# Patient Record
Sex: MALE | Race: WHITE | Employment: STUDENT | URBAN - METROPOLITAN AREA
[De-identification: names, ages, dates, MRNs, and addresses within clinical notes are randomized per-mention and may not be internally consistent; named-entity substitution may affect disease eponyms.]

---
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
## 2020-01-02 ENCOUNTER — APPOINTMENT (OUTPATIENT)
Dept: GENERAL RADIOLOGY | Age: 20
End: 2020-01-02
Attending: PHYSICIAN ASSISTANT
Payer: COMMERCIAL

## 2020-01-02 ENCOUNTER — HOSPITAL ENCOUNTER (OUTPATIENT)
Age: 20
Discharge: HOME | End: 2020-01-02
Payer: COMMERCIAL

## 2020-01-02 VITALS
TEMPERATURE: 97 F | OXYGEN SATURATION: 100 % | DIASTOLIC BLOOD PRESSURE: 59 MMHG | HEART RATE: 72 BPM | RESPIRATION RATE: 20 BRPM | SYSTOLIC BLOOD PRESSURE: 135 MMHG

## 2020-01-02 DIAGNOSIS — S97.102A CRUSHING INJURY OF TOE OF LEFT FOOT, INITIAL ENCOUNTER: Primary | ICD-10-CM

## 2020-01-02 PROCEDURE — 99214 OFFICE O/P EST MOD 30 MIN: CPT

## 2020-01-02 PROCEDURE — 99213 OFFICE O/P EST LOW 20 MIN: CPT

## 2020-01-02 PROCEDURE — 73660 X-RAY EXAM OF TOE(S): CPT | Performed by: PHYSICIAN ASSISTANT

## 2020-01-02 RX ORDER — CEPHALEXIN 500 MG/1
500 CAPSULE ORAL 4 TIMES DAILY
Qty: 40 CAPSULE | Refills: 0 | Status: SHIPPED | OUTPATIENT
Start: 2020-01-02 | End: 2020-01-12

## 2020-01-03 NOTE — ED INITIAL ASSESSMENT (HPI)
WORKER'S COMP INITIAL INJURY    >> CHIEF COMPLAINT:      Left big toe injury            DATE OF INJURY:    Jan 2, 2020                               TIME:    11 am                                 JOB:     [employer]                       COMPANY:    [employer]

## 2020-01-03 NOTE — ED PROVIDER NOTES
Patient Seen in: THE [facility] Care In [facility]      History   Patient presents with:   Toe Injury    Stated Complaint: W/C Left foot injury    HPI    19-year-old male here with complaint of pain and swelling to his left foot first digit after a for Nose normal.      Mouth/Throat:      Mouth: Mucous membranes are moist.   Eyes:      Conjunctiva/sclera: Conjunctivae normal.      Pupils: Pupils are equal, round, and reactive to light. Neck:      Musculoskeletal: Normal range of motion and neck supple. compression elevation. Take the full course of antibiotics as prescribed. Change dressing daily. Look for any continuing signs and symptoms of infection: Redness, swelling, streaking, drainage or fevers.   Follow-up with your primary care physician and/o

## (undated) NOTE — ED AVS SNAPSHOT
THE [facility] Emergency Department in [address] [facility]    Phone:  [phone]    Fax:  [phone]           Elsa Allen   MRN: [id number]    Department:  THE [facility] Emergency Department in [place]   Date of Visit: IF THERE IS ANY CHANGE OR WORSENING OF YOUR CONDITION, CALL YOUR PRIMARY CARE PHYSICIAN AT ONCE OR RETURN IMMEDIATELY TO THE EMERGENCY DEPARTMENT.     If you have been prescribed any medication(s), please fill your prescription right away and begin taking t

## (undated) NOTE — LETTER
Date: 4/11/2018    Patient Name: Elvis Delaney          To Whom it may concern: This letter has been written at the patient's request. The above patient was seen at the [facility] for treatment of a medical condition.     The patient may r

## (undated) NOTE — ED AVS SNAPSHOT
[facility] Emergency Department in [address] [place] [address]    Phone:  [phone]    Fax:  [phone]           Kristan Patton   MRN: [id number]    Department:  [facility] Emergency Department in [place]   Date of Visit: Si usted tiene algun problema con jamil sequimiento, por favor llame a nuestro adminstrador de jean paul al [phone]    Expect to receive an electronic request (by e-mail or text) to complete a self-assessment the day after your visit.   You may also receiv [facility] [address] ([address]) [address]  ([address]) [address] [facility]. ([address]) [address] [address] [facility]. (8

## (undated) NOTE — ED AVS SNAPSHOT
Neena Iva   MRN: [id number]    Department:  THE [facility] Emergency Department in [place]   Date of Visit:  10/10/2017           Disclosure     Insurance plans vary and the physician(s) referred by the ER may not be covered by your plan.  Please contac If you have been prescribed any medication(s), please fill your prescription right away and begin taking the medication(s) as directed    If the emergency physician has read X-rays, these will be re-interpreted by a radiologist.  If there is a significant

## (undated) NOTE — ED AVS SNAPSHOT
Domingo Padilla   MRN: [id number]    Department:  [facility] Emergency Department   Date of Visit:  8/23/2017           Disclosure     Insurance plans vary and the physician(s) referred by the ER may not be covered by your plan.  Please contact your If you have been prescribed any medication(s), please fill your prescription right away and begin taking the medication(s) as directed    If the emergency physician has read X-rays, these will be re-interpreted by a radiologist.  If there is a significant

## (undated) NOTE — ED AVS SNAPSHOT
[facility] in [address]    Phone:  [phone]    Fax:  [address] [facility]   MRN: [id number]    Department:  [facility] in [facility]   Date of Visit:  4/12/2017 antibiotic for sore throat. Gargle with salt water/mouthwash. If any worsening of symptoms/dizziness/nausea/headache gets worse please return immediately/go to the ER. We like to see the creatinine trend down if you are hydrating herself well.       Jesusita Martínez receive this, we would really appreciate it if you could take the time to complete it. Thank you! You were examined and treated today on an urgent basis only. This was not a substitute for ongoing medical care.  Often, one Immediate Care visit does no [address]  [facility] ([address]) [facility] [address] ([address]) [address]  [place] ([address]) 21  [address] ([address]) [address]

## (undated) NOTE — LETTER
October 11, 2017    Patient: Guadalupe Bear   Date of Visit: 10/10/2017       To Whom It May Concern:    Machelle Carrera was seen and treated in our emergency department on 10/10/2017.  He should not participate in gym/sports until cleared by primary docto

## (undated) NOTE — MR AVS SNAPSHOT
[facility]  [address] [id number] [phone]               Thank you for choosing us for your health care visit with Denton Gomes PA-C. We are glad to serve you and happy to provide you with this summary of your visit. health care provider may prescribe medications to help ease your child's pain or discomfort. The best treatment for mono is rest. A child with mono should also drink lots of fluids.  To help your child feel better and recover sooner:  · Make sure the child · Seems unusually drowsy, listless, or unresponsive. · Has trouble eating, drinking, or swallowing. · Stops breathing, even for an instant. · Shows signs of severe chest, neck, or abdominal pain.   Date Last Reviewed: 9/5/2014  © 3075-2770 [employer]

## (undated) NOTE — Clinical Note
Date: 2/10/2017    Patient Name: Magdy Gonzales          To Whom it may concern: This letter has been written at the patient's request. The above patient was seen at the [facility] for treatment of a medical condition.     This patient heber

## (undated) NOTE — Clinical Note
I saw Livan Roman in the [facility] in [facility] today for pharyngitis, negative rapids strep,  he was treated with comfort care and culture was sent. Livan Roman will follow up with you if no better or as needed.  Thank you for the opportunity to care for Livan Roman today-La

## (undated) NOTE — Clinical Note
Dear Dr. Lara Lorenz,       Thank you for referring Jaguar Lacey to the [facility].   Sincerely,  JUSTUS Moore

## (undated) NOTE — LETTER
Date: 1/25/2018    Patient Name: Jc Bonilla          To Whom it may concern: The above patient was seen at the [facility] for treatment of a medical condition. This patient should be excused from attending school through 1/26/18.

## (undated) NOTE — ED AVS SNAPSHOT
[facility]  Dara Ortiz    [address] [place] [address]    Phone:  [phone]    Fax:  [phone]           Louis Boothe   MRN: [id number]    Department:  THE [facility] Care at [facility]   Date of Visit:  5 If you have any problems with your follow-up, please call our  at [phone]. Si usted tiene algun problema con jamil sequimiento, por favor llame a nuestro adminstrador de casos al [phone].     Expect to receive an electronic reques Nicole Swanson [address] ([address]) [place] ([address] [address] [facility]9   [facility] [address] ([address] [facility]) [address]  (Do